# Patient Record
(demographics unavailable — no encounter records)

---

## 2025-04-11 NOTE — PHYSICAL EXAM
[No Rash or Lesion] : No rash or lesion [Calm] : calm [de-identified] : no acute distress noted [de-identified] : breathing comfortably on room air [de-identified] : ISHANR [de-identified] : soft, non-distended, non-tender. All incisions well healed. No hernia noted. [de-identified] : Full range of motion [de-identified] : History of anxiety and depression

## 2025-04-11 NOTE — PHYSICAL EXAM
[No Rash or Lesion] : No rash or lesion [Calm] : calm [de-identified] : no acute distress noted [de-identified] : breathing comfortably on room air [de-identified] : ISHANR [de-identified] : soft, non-distended, non-tender. All incisions well healed. No hernia noted. [de-identified] : Full range of motion [de-identified] : History of anxiety and depression

## 2025-04-11 NOTE — ASSESSMENT
[FreeTextEntry1] : 74-year-old female status post hiatal hernia repair she is doing very well is back to her regular diet back to her daily activities all of her questions were answered she will follow-up in 6 months to a year

## 2025-04-11 NOTE — HISTORY OF PRESENT ILLNESS
[de-identified] : Jennifer is a 75 y/o female being seen for a follow-up visit.  The patient comes back for follow-up visit after her hiatal hernia repair she currently is feeling very well tolerating a diet eating chicken fish and salad at has no reflux and is very happy with the result s/p Laparoscopic repair of type 3 paraesophageal hernia and Toupet partial fundoplication on 5/1/24   Last seen 8/12/24 - 73-year-old female status post repair of large type III hiatal hernia (5/2024) she is doing well on regular diet with small bites/slow pace. No more GERD and no need for PPIs since surgery. - Continue regular diet: eat slow/ chewing fully/ cut in smaller bites - Continue home meds - Return for follow up in 8 months.  Today patient denies surgical incisional pain.  BMs regular once daily.  Good appetite.  Denies nausea or vomiting.  No fever or chills.  Surgical incision sites well approximated.  Has very rare acid reflux.

## 2025-04-11 NOTE — HISTORY OF PRESENT ILLNESS
[de-identified] : Jennifer is a 75 y/o female being seen for a follow-up visit.  The patient comes back for follow-up visit after her hiatal hernia repair she currently is feeling very well tolerating a diet eating chicken fish and salad at has no reflux and is very happy with the result s/p Laparoscopic repair of type 3 paraesophageal hernia and Toupet partial fundoplication on 5/1/24   Last seen 8/12/24 - 73-year-old female status post repair of large type III hiatal hernia (5/2024) she is doing well on regular diet with small bites/slow pace. No more GERD and no need for PPIs since surgery. - Continue regular diet: eat slow/ chewing fully/ cut in smaller bites - Continue home meds - Return for follow up in 8 months.  Today patient denies surgical incisional pain.  BMs regular once daily.  Good appetite.  Denies nausea or vomiting.  No fever or chills.  Surgical incision sites well approximated.  Has very rare acid reflux.